# Patient Record
Sex: FEMALE | Race: WHITE | NOT HISPANIC OR LATINO | Employment: OTHER | URBAN - METROPOLITAN AREA
[De-identification: names, ages, dates, MRNs, and addresses within clinical notes are randomized per-mention and may not be internally consistent; named-entity substitution may affect disease eponyms.]

---

## 2019-10-02 ENCOUNTER — AMBULATORY - RIVER FALLS (OUTPATIENT)
Dept: FAMILY MEDICINE | Facility: CLINIC | Age: 54
End: 2019-10-02

## 2021-04-19 ENCOUNTER — TRANSFERRED RECORDS (OUTPATIENT)
Dept: HEALTH INFORMATION MANAGEMENT | Facility: CLINIC | Age: 56
End: 2021-04-19

## 2021-05-04 ENCOUNTER — TRANSFERRED RECORDS (OUTPATIENT)
Dept: HEALTH INFORMATION MANAGEMENT | Facility: CLINIC | Age: 56
End: 2021-05-04

## 2022-07-09 ENCOUNTER — TELEPHONE ENCOUNTER (OUTPATIENT)
Dept: URBAN - METROPOLITAN AREA CLINIC 121 | Facility: CLINIC | Age: 57
End: 2022-07-09

## 2022-07-10 ENCOUNTER — TELEPHONE ENCOUNTER (OUTPATIENT)
Dept: URBAN - METROPOLITAN AREA CLINIC 121 | Facility: CLINIC | Age: 57
End: 2022-07-10

## 2022-09-15 ENCOUNTER — TRANSFERRED RECORDS (OUTPATIENT)
Dept: HEALTH INFORMATION MANAGEMENT | Facility: CLINIC | Age: 57
End: 2022-09-15

## 2022-09-16 ENCOUNTER — TRANSFERRED RECORDS (OUTPATIENT)
Dept: HEALTH INFORMATION MANAGEMENT | Facility: CLINIC | Age: 57
End: 2022-09-16

## 2022-09-16 LAB
CHOLESTEROL (EXTERNAL): 168 MG/DL (ref 0–200)
HBA1C MFR BLD: 5.3 %
HDLC SERPL-MCNC: 52 MG/DL (ref 35–65)
LDL CHOLESTEROL (EXTERNAL): 98 MG/DL (ref 0–130)
POTASSIUM (EXTERNAL): 4.1 MEQ/L (ref 3.5–5.1)
TRIGLYCERIDES (EXTERNAL): 91 MG/DL (ref 0–200)

## 2022-09-28 ENCOUNTER — MEDICAL CORRESPONDENCE (OUTPATIENT)
Dept: HEALTH INFORMATION MANAGEMENT | Facility: CLINIC | Age: 57
End: 2022-09-28

## 2022-10-05 ENCOUNTER — TRANSCRIBE ORDERS (OUTPATIENT)
Dept: OTHER | Age: 57
End: 2022-10-05

## 2022-10-05 DIAGNOSIS — Q23.81 BICUSPID AORTIC VALVE: ICD-10-CM

## 2022-10-05 DIAGNOSIS — R53.83 FATIGUE: ICD-10-CM

## 2022-10-05 DIAGNOSIS — R06.02 SOB (SHORTNESS OF BREATH): Primary | ICD-10-CM

## 2022-11-03 ENCOUNTER — OFFICE VISIT (OUTPATIENT)
Dept: CARDIOLOGY | Facility: CLINIC | Age: 57
End: 2022-11-03
Attending: FAMILY MEDICINE
Payer: COMMERCIAL

## 2022-11-03 VITALS
DIASTOLIC BLOOD PRESSURE: 78 MMHG | HEIGHT: 69 IN | RESPIRATION RATE: 16 BRPM | WEIGHT: 255 LBS | BODY MASS INDEX: 37.77 KG/M2 | OXYGEN SATURATION: 95 % | HEART RATE: 67 BPM | SYSTOLIC BLOOD PRESSURE: 128 MMHG

## 2022-11-03 DIAGNOSIS — I10 BENIGN ESSENTIAL HYPERTENSION: ICD-10-CM

## 2022-11-03 DIAGNOSIS — Q23.81 BICUSPID AORTIC VALVE: Primary | ICD-10-CM

## 2022-11-03 DIAGNOSIS — Z82.49 FAMILY HISTORY OF ISCHEMIC HEART DISEASE: ICD-10-CM

## 2022-11-03 DIAGNOSIS — I35.0 NONRHEUMATIC AORTIC VALVE STENOSIS: ICD-10-CM

## 2022-11-03 DIAGNOSIS — I35.1 NONRHEUMATIC AORTIC VALVE INSUFFICIENCY: ICD-10-CM

## 2022-11-03 PROCEDURE — 99204 OFFICE O/P NEW MOD 45 MIN: CPT | Performed by: INTERNAL MEDICINE

## 2022-11-03 RX ORDER — MELOXICAM 15 MG/1
15 TABLET ORAL DAILY
COMMUNITY

## 2022-11-03 RX ORDER — HYDROCHLOROTHIAZIDE 25 MG/1
25 TABLET ORAL
COMMUNITY

## 2022-11-03 RX ORDER — LOSARTAN POTASSIUM 25 MG/1
25 TABLET ORAL DAILY
COMMUNITY
End: 2022-11-03

## 2022-11-03 RX ORDER — CYANOCOBALAMIN 1000 UG/ML
INJECTION INTRAMUSCULAR; SUBCUTANEOUS
COMMUNITY
Start: 2022-09-15 | End: 2023-11-30

## 2022-11-03 RX ORDER — TRAZODONE HYDROCHLORIDE 100 MG/1
100 TABLET ORAL AT BEDTIME
COMMUNITY

## 2022-11-03 RX ORDER — SULFAMETHOXAZOLE/TRIMETHOPRIM 800-160 MG
TABLET ORAL
COMMUNITY
Start: 2022-04-13 | End: 2022-11-03

## 2022-11-03 RX ORDER — ORAL SEMAGLUTIDE 3 MG/1
1 TABLET ORAL DAILY
COMMUNITY
Start: 2022-10-13 | End: 2023-11-30

## 2022-11-03 RX ORDER — TRIAMCINOLONE ACETONIDE 1 MG/G
CREAM TOPICAL
COMMUNITY
Start: 2022-03-16 | End: 2022-11-03

## 2022-11-03 RX ORDER — COLCHICINE 0.6 MG/1
TABLET ORAL
COMMUNITY
Start: 2022-03-02 | End: 2022-11-03

## 2022-11-03 RX ORDER — SUMATRIPTAN 100 MG/1
100 TABLET, FILM COATED ORAL
COMMUNITY

## 2022-11-03 RX ORDER — ESOMEPRAZOLE MAGNESIUM 40 MG/1
40 CAPSULE, DELAYED RELEASE ORAL DAILY
COMMUNITY

## 2022-11-03 RX ORDER — NEEDLES, SAFETY 22GX1 1/2"
NEEDLE, DISPOSABLE MISCELLANEOUS
COMMUNITY
Start: 2022-07-13

## 2022-11-03 RX ORDER — MECLIZINE HCL 12.5 MG 12.5 MG/1
TABLET ORAL
COMMUNITY
Start: 2022-07-13 | End: 2022-11-03

## 2022-11-03 RX ORDER — SCOLOPAMINE TRANSDERMAL SYSTEM 1 MG/1
PATCH, EXTENDED RELEASE TRANSDERMAL
COMMUNITY
Start: 2022-08-04 | End: 2022-11-03

## 2022-11-03 RX ORDER — DESVENLAFAXINE 50 MG/1
50 TABLET, FILM COATED, EXTENDED RELEASE ORAL DAILY
COMMUNITY
End: 2022-11-03

## 2022-11-03 NOTE — PROGRESS NOTES
[facility]  [phone]      Assessment/Recommendations   Patient with known bicuspid aortic valve with a heart murmur described at age 18.  She has mild aortic stenosis and mild to moderate aortic insufficiency with normal left ventricular systolic function.  She does not have any symptoms of angina, unusual shortness of breath with activity, or syncope with activity.  She has borderline left ventricular hypertrophy.    We talked about bicuspid aortic valve and the symptoms to watch for and I would recommend a repeat echocardiogram in 1 year.  She has had cardiac MRIs in the past and we may want a repeat in the next couple of years so that we can get it even better look at the ascending aorta.    She does have a family history of coronary artery disease and wondered about reducing her risk of cardiac events.  Her LDL cholesterol is actually pretty good but she would be interested in having a calcium score so we will get that in the near future and get back to her with the results.    I have not change in her medications today.  We will plan on seeing her back in 1 year after echocardiogram and hopefully we can do that at the [facility].    Thank you for allowing us to participate in her care.    45 minutes spent with chart review, patient visit, documentation, further chart review, and .       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  She had a heart murmur described at age 18 and has been followed the last several years with echoes and cardiac MRIs.  Her valve is been stable.  She has mild aortic stenosis and mild to moderate aortic insufficiency with normal left ventricular ejection fraction and borderline concentric left ventricular hypertrophy.  She has been feeling well.  She tried a medication for weight loss which caused her to feel short of breath with the breathing issue resolved when she  "went off of the medication.  She denies orthopnea, paroxysmal nocturnal dyspnea, peripheral edema, syncope or near syncopal episodes.  Sometimes when she exercises she will get lightheaded and put her head between her knees but this is rare and has not happened for quite some time.  She has no history rheumatic fever, cerebrovascular accident or TIA.    She is never smoked cigarettes has been treated for high blood pressure and her mother has had an MI which was silent on an EKG and had heart muscle damage so I presume she had an echocardiogram.  Patient is not diabetic and LDL cholesterol is 88.    The patient obtain a PhD at the [employer] in business.  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  This was very stressful during COVID and she ended up resigning and is trying to relieve stress and take a year off while she is going out what her next steps will be.  She is not  and she does not have children.           Physical Examination Review of Systems   /78 (BP Location: Left arm, Patient Position: Sitting, Cuff Size: Adult Large)   Pulse 67   Resp 16   Ht 1.759 m (5' 9.25\")   Wt 115.7 kg (255 lb)   SpO2 95%   BMI 37.39 kg/m    Body mass index is 37.39 kg/m .  Wt Readings from Last 3 Encounters:   11/03/22 115.7 kg (255 lb)   03/07/14 96.6 kg (213 lb)     General Appearance:   Alert, cooperative and in no acute distress.   ENT/Mouth: Patient wearing a mask.      EYES:  no scleral icterus, normal conjunctivae   Neck: JVP normal. No Hepatojugular reflux. Thyroid not visualized.   Chest/Lungs:   Lungs are clear to auscultation, equal chest wall expansion.   Cardiovascular:   S1, S2 with 2/6 systolic and 1/6 diastolic murmurs , no clicks or rubs. Brachial, radial  pulses are intact and symetric. No carotid bruits noted   Abdomen:  Nontender. BS+.    Extremities: No cyanosis, clubbing or edema   Skin: no " "xanthelasma, warm.    Neurologic: normal arm movement bilateral, no tremors     Psychiatric: Appropriate affect.      Enc Vitals  BP: 128/78  Pulse: 67  Resp: 16  SpO2: 95 %  Weight: 115.7 kg (255 lb)  Height: 175.9 cm (5' 9.25\")                                           Medical History  Surgical History Family History Social History   No past medical history on file. No past surgical history on file. No family history on file. Social History     Socioeconomic History     Marital status: Single     Spouse name: Not on file     Number of children: Not on file     Years of education: Not on file     Highest education level: Not on file   Occupational History     Not on file   Tobacco Use     Smoking status: Never     Smokeless tobacco: Never   Vaping Use     Vaping Use: Never used   Substance and Sexual Activity     Alcohol use: Yes     Drug use: Never     Sexual activity: Not on file   Other Topics Concern     Not on file   Social History Narrative     Not on file     Social Determinants of Health     Financial Resource Strain: Not on file   Food Insecurity: Not on file   Transportation Needs: Not on file   Physical Activity: Not on file   Stress: Not on file   Social Connections: Not on file   Intimate Partner Violence: Not on file   Housing Stability: Not on file          Medications  Allergies   Current Outpatient Medications   Medication Sig Dispense Refill     B-D TB SYRINGE 27G X 1/2\" 1 ML MISC USE 1 SYRINGE PER MONTH TO ADMINISTER VITAMIN B12       DODEX 1000 MCG/ML injection        esomeprazole (NEXIUM) 40 MG DR capsule Take 40 mg by mouth daily       hydrochlorothiazide (HYDRODIURIL) 25 MG tablet Take 25 mg by mouth       meloxicam (MOBIC) 15 MG tablet Take 15 mg by mouth       SUMAtriptan (IMITREX) 100 MG tablet Take 100 mg by mouth       traZODone (DESYREL) 100 MG tablet Take 100 mg by mouth       RYBELSUS 3 MG TABS Take 1 tablet by mouth daily (Patient not taking: Reported on 11/3/2022)      Allergies " "  Allergen Reactions     Nitrofurantoin Difficulty breathing and Other (See Comments)     \"Dantin\" group side effects  Pt cannot remember reaction       No Clinical Screening - See Comments Itching     Dust, pollen, cat dander,(lives with 3 cats), watermelon, make up- runny nose, sneezing, itchy eyes  Dust, pollen, cat dander,(lives with 3 cats), watermelon, make up- runny nose, sneezing, itchy eyes     Quinolones Other (See Comments)     Other reaction(s): Torn muscle  Torn muscle  Tendon inflammation       Zoster Vaccine Live Rash         Lab Results    Chemistry/lipid CBC Cardiac Enzymes/BNP/TSH/INR   Lab Results   Component Value Date    TRIG 91 09/16/2022    No results found for: WBC, HGB, HCT, MCV, PLT No results found for: CKTOTAL, CKMB, TROPONINI, BNP, TSH, INR                                        "

## 2022-11-14 ENCOUNTER — HOSPITAL ENCOUNTER (OUTPATIENT)
Dept: CT IMAGING | Facility: CLINIC | Age: 57
Discharge: HOME | End: 2022-11-14
Attending: INTERNAL MEDICINE | Admitting: INTERNAL MEDICINE

## 2022-11-14 DIAGNOSIS — Z82.49 FAMILY HISTORY OF ISCHEMIC HEART DISEASE: ICD-10-CM

## 2022-11-14 DIAGNOSIS — I10 BENIGN ESSENTIAL HYPERTENSION: ICD-10-CM

## 2022-11-14 DIAGNOSIS — Q23.81 BICUSPID AORTIC VALVE: ICD-10-CM

## 2022-11-14 DIAGNOSIS — I35.1 NONRHEUMATIC AORTIC VALVE INSUFFICIENCY: ICD-10-CM

## 2022-11-14 DIAGNOSIS — I35.0 NONRHEUMATIC AORTIC VALVE STENOSIS: ICD-10-CM

## 2022-11-14 LAB
CV CALCIUM SCORE AGATSTON LM: 0
CV CALCIUM SCORING AGATSON LAD: 0
CV CALCIUM SCORING AGATSTON CX: 0
CV CALCIUM SCORING AGATSTON RCA: 0
CV CALCIUM SCORING AGATSTON TOTAL: 0

## 2022-11-14 PROCEDURE — 75571 CT HRT W/O DYE W/CA TEST: CPT

## 2022-11-14 PROCEDURE — 75571 CT HRT W/O DYE W/CA TEST: CPT | Mod: 26 | Performed by: INTERNAL MEDICINE

## 2022-11-17 DIAGNOSIS — Z82.49 FAMILY HISTORY OF ISCHEMIC HEART DISEASE: ICD-10-CM

## 2022-11-17 DIAGNOSIS — Q23.81 BICUSPID AORTIC VALVE: Primary | ICD-10-CM

## 2023-02-12 ENCOUNTER — HEALTH MAINTENANCE LETTER (OUTPATIENT)
Age: 58
End: 2023-02-12

## 2023-08-10 ENCOUNTER — TRANSFERRED RECORDS (OUTPATIENT)
Dept: HEALTH INFORMATION MANAGEMENT | Facility: CLINIC | Age: 58
End: 2023-08-10

## 2023-08-10 LAB
ALT SERPL-CCNC: 25 U/L
AST SERPL-CCNC: 26 U/L (ref 14–36)
CHOLESTEROL (EXTERNAL): 192 MG/DL (ref 0–200)
CREATININE (EXTERNAL): 0.9 MG/DL (ref 0.7–1.4)
GFR ESTIMATED (EXTERNAL): >60 ML/MIN/1.73M2
GLUCOSE (EXTERNAL): 85 MG/DL (ref 60–99)
HBA1C MFR BLD: 5.05 % (ref 4–5.6)
HDLC SERPL-MCNC: 51 MG/DL (ref 35–65)
LDL CHOLESTEROL CALCULATED (EXTERNAL): 120 MG/DL (ref 0–130)
POTASSIUM (EXTERNAL): 3.9 MMOL/L (ref 3.5–5.1)
TRIGLYCERIDES (EXTERNAL): 103 MG/DL (ref 0–200)
TSH SERPL-ACNC: 2.15 MIU/L (ref 0.47–4.68)

## 2023-10-12 LAB — PAP SMEAR - HIM PATIENT REPORTED: NORMAL

## 2023-11-20 ENCOUNTER — HOSPITAL ENCOUNTER (OUTPATIENT)
Dept: CARDIOLOGY | Facility: CLINIC | Age: 58
Discharge: HOME | End: 2023-11-20
Attending: INTERNAL MEDICINE | Admitting: INTERNAL MEDICINE
Payer: COMMERCIAL

## 2023-11-20 DIAGNOSIS — I10 BENIGN ESSENTIAL HYPERTENSION: ICD-10-CM

## 2023-11-20 DIAGNOSIS — I35.0 NONRHEUMATIC AORTIC VALVE STENOSIS: ICD-10-CM

## 2023-11-20 DIAGNOSIS — Q23.81 BICUSPID AORTIC VALVE: ICD-10-CM

## 2023-11-20 DIAGNOSIS — I35.1 NONRHEUMATIC AORTIC VALVE INSUFFICIENCY: ICD-10-CM

## 2023-11-20 DIAGNOSIS — Z82.49 FAMILY HISTORY OF ISCHEMIC HEART DISEASE: ICD-10-CM

## 2023-11-20 LAB — LVEF ECHO: NORMAL

## 2023-11-20 PROCEDURE — 93306 TTE W/DOPPLER COMPLETE: CPT | Mod: 26 | Performed by: INTERNAL MEDICINE

## 2023-11-20 PROCEDURE — 93306 TTE W/DOPPLER COMPLETE: CPT

## 2023-11-30 ENCOUNTER — OFFICE VISIT (OUTPATIENT)
Dept: CARDIOLOGY | Facility: CLINIC | Age: 58
End: 2023-11-30
Payer: COMMERCIAL

## 2023-11-30 VITALS — WEIGHT: 227.2 LBS | HEART RATE: 92 BPM | BODY MASS INDEX: 33.65 KG/M2 | RESPIRATION RATE: 16 BRPM | HEIGHT: 69 IN

## 2023-11-30 DIAGNOSIS — I48.0 PAROXYSMAL ATRIAL FIBRILLATION (H): Primary | ICD-10-CM

## 2023-11-30 DIAGNOSIS — Q23.81 BICUSPID AORTIC VALVE: ICD-10-CM

## 2023-11-30 DIAGNOSIS — G47.33 OSA (OBSTRUCTIVE SLEEP APNEA): ICD-10-CM

## 2023-11-30 DIAGNOSIS — I77.89 ASCENDING AORTA ENLARGEMENT (H): ICD-10-CM

## 2023-11-30 PROCEDURE — 99214 OFFICE O/P EST MOD 30 MIN: CPT | Performed by: INTERNAL MEDICINE

## 2023-11-30 RX ORDER — COLCHICINE 0.6 MG/1
0.6 TABLET ORAL PRN
COMMUNITY
Start: 2022-03-02

## 2023-11-30 RX ORDER — TIRZEPATIDE 7.5 MG/.5ML
7.5 INJECTION, SOLUTION SUBCUTANEOUS WEEKLY
COMMUNITY

## 2023-11-30 NOTE — PROGRESS NOTES
[facility]  [phone]          Assessment/Recommendations   Patient with known bicuspid aortic valve, recent echocardiogram shows mild aortic stenosis and mild aortic insufficiency which is stable.  Ascending aorta is also mildly enlarged and we will repeat imaging next year with a cardiac MRI.  This will allow us to look at the ascending aorta in a very detailed fashion as well.    She also has an episode of atrial fibrillation while in [place].  Episode was 5 hours and she did have some palpitations.  She has not had a recurrence.  She showed me strips from her Apple Watch and I believe it is clear-cut atrial fibrillation.  A little unusual that the ventricular response is not particularly fast.  She has a CHADS2 vascular score of 3.  We talked about anticoagulation and given the relatively brief nature of the episode and one-time occurrence I have not recommended anticoagulation at this point in time.      We will get a 2-week ACT monitor to see if she is having asymptomatic atrial fibrillation as she does have mild to moderate sleep apnea is not treating it because of intolerance to the mask.  Will ask her to get a TSH when we get the monitor results back and ask her to see one of our electrophysiologist as well.  Would also have a low threshold for stress testing.    Thank you for allowing us to participate in her care.       History of Present Illness/Subjective    Ms. Jenise Baker is a 58 year old female with known bicuspid aortic valve which we have been following.  She has not been exercising but overall feels good.  She denies orthopnea, paroxysmal nocturnal dyspnea, and gets some minimal peripheral edema.  She has not had any syncopal episodes but does get a little lightheaded at times.  She takes hydrochlorothiazide for her peripheral edema.  She has not been having any chest discomfort.  While in [place] and overdoing it a bit on being up late at night and early in the morning  "as well as a high sodium diet she had an episode of atrial fibrillation as documented on her Apple Watch.  It lasted about 5 hours and then abated and has not returned.  She did have palpitations.    She denies chest discomfort, orthopnea, paroxysmal nocturnal dyspnea.  She has not had syncopal episodes.    Echocardiogram showed stable bicuspid aortic valve.         Physical Examination Review of Systems   Pulse 92   Resp 16   Ht 1.753 m (5' 9\")   Wt 103.1 kg (227 lb 3.2 oz)   BMI 33.55 kg/m    Body mass index is 33.55 kg/m .  Wt Readings from Last 3 Encounters:   11/30/23 103.1 kg (227 lb 3.2 oz)   11/03/22 115.7 kg (255 lb)   03/07/14 96.6 kg (213 lb)     General Appearance:   Alert, cooperative and in no acute distress.   ENT/Mouth: Pink/moist oral mucosa   EYES:  no scleral icterus, normal conjunctivae   Neck: JVP normal. No Hepatojugular reflux. Thyroid not visualized.   Chest/Lungs:   Lungs are clear to auscultation, equal chest wall expansion.   Cardiovascular:   S1, S2 with 3/6 systolic murmur , no clicks or rubs. Brachial, radial pulses are intact and symetric. No carotid bruits noted   Abdomen:  Nontender. BS+.    Extremities: No cyanosis, clubbing and trivial pretibial edema   Skin: no xanthelasma, warm.    Neurologic: normal arm movement bilateral, no tremors     Psychiatric: Appropriate affect.      Enc Vitals  BP:  (would not allow for bp because of too tight on arm)  Pulse: 92  Resp: 16  Weight: 103.1 kg (227 lb 3.2 oz)  Height: 175.3 cm (5' 9\")                                           Medical History  Surgical History Family History Social History   No past medical history on file. No past surgical history on file. No family history on file. Social History     Socioeconomic History    Marital status: Single     Spouse name: Not on file    Number of children: Not on file    Years of education: Not on file    Highest education level: Not on file   Occupational History    Not on file   Tobacco Use " "   Smoking status: Never    Smokeless tobacco: Never   Vaping Use    Vaping Use: Never used   Substance and Sexual Activity    Alcohol use: Yes    Drug use: Never    Sexual activity: Not on file   Other Topics Concern    Not on file   Social History Narrative    Not on file     Social Determinants of Health     Financial Resource Strain: Not on file   Food Insecurity: Not on file   Transportation Needs: Not on file   Physical Activity: Not on file   Stress: Not on file   Social Connections: Not on file   Interpersonal Safety: Not on file   Housing Stability: Not on file          Medications  Allergies   Current Outpatient Medications   Medication Sig Dispense Refill    B-D TB SYRINGE 27G X 1/2\" 1 ML MISC USE 1 SYRINGE PER MONTH TO ADMINISTER VITAMIN B12      colchicine (COLCRYS) 0.6 MG tablet Take 0.6 mg by mouth as needed      esomeprazole (NEXIUM) 40 MG DR capsule Take 40 mg by mouth daily      hydrochlorothiazide (HYDRODIURIL) 25 MG tablet Take 25 mg by mouth      meloxicam (MOBIC) 15 MG tablet Take 15 mg by mouth daily      MOUNJARO 7.5 MG/0.5ML pen Inject 7.5 mg Subcutaneous once a week      SUMAtriptan (IMITREX) 100 MG tablet Take 100 mg by mouth at onset of headache      traZODone (DESYREL) 100 MG tablet Take 100 mg by mouth at bedtime      Allergies   Allergen Reactions    Nitrofurantoin Difficulty breathing and Other (See Comments)     \"Dantin\" group side effects  Pt cannot remember reaction      No Clinical Screening - See Comments Itching     Dust, pollen, cat dander,(lives with 3 cats), watermelon, make up- runny nose, sneezing, itchy eyes  Dust, pollen, cat dander,(lives with 3 cats), watermelon, make up- runny nose, sneezing, itchy eyes    Quinolones Other (See Comments)     Other reaction(s): Torn muscle  Torn muscle  Tendon inflammation      Zoster Vaccine Live Rash         Lab Results    Chemistry/lipid CBC Cardiac Enzymes/BNP/TSH/INR   Lab Results   Component Value Date    TRIG 91 09/16/2022    No " "results found for: \"WBC\", \"HGB\", \"HCT\", \"MCV\", \"PLT\" No results found for: \"CKTOTAL\", \"CKMB\", \"TROPONINI\", \"BNP\", \"TSH\", \"INR\"                                         "

## 2023-11-30 NOTE — LETTER
11/30/2023    Shai White MD  [facility] [address]  [facility] [address]    RE: Jenise Baker       Dear Colleague,     I had the pleasure of seeing Jenise Baker in the [facility].      [facility]  [phone]          Assessment/Recommendations   Patient with known bicuspid aortic valve, recent echocardiogram shows mild aortic stenosis and mild aortic insufficiency which is stable.  Ascending aorta is also mildly enlarged and we will repeat imaging next year with a cardiac MRI.  This will allow us to look at the ascending aorta in a very detailed fashion as well.    She also has an episode of atrial fibrillation while in [place].  Episode was 5 hours and she did have some palpitations.  She has not had a recurrence.  She showed me strips from her Apple Watch and I believe it is clear-cut atrial fibrillation.  A little unusual that the ventricular response is not particularly fast.  She has a CHADS2 vascular score of 3.  We talked about anticoagulation and given the relatively brief nature of the episode and one-time occurrence I have not recommended anticoagulation at this point in time.      We will get a 2-week ACT monitor to see if she is having asymptomatic atrial fibrillation as she does have mild to moderate sleep apnea is not treating it because of intolerance to the mask.  Will ask her to get a TSH when we get the monitor results back and ask her to see one of our electrophysiologist as well.  Would also have a low threshold for stress testing.    Thank you for allowing us to participate in her care.       History of Present Illness/Subjective    Ms. Jenise Baker is a 58 year old female with known bicuspid aortic valve which we have been following.  She has not been exercising but overall feels good.  She denies orthopnea, paroxysmal nocturnal dyspnea, and gets some minimal peripheral edema.  She has not had any syncopal episodes but does get a little  "lightheaded at times.  She takes hydrochlorothiazide for her peripheral edema.  She has not been having any chest discomfort.  While in [place] and overdoing it a bit on being up late at night and early in the morning as well as a high sodium diet she had an episode of atrial fibrillation as documented on her Apple Watch.  It lasted about 5 hours and then abated and has not returned.  She did have palpitations.    She denies chest discomfort, orthopnea, paroxysmal nocturnal dyspnea.  She has not had syncopal episodes.    Echocardiogram showed stable bicuspid aortic valve.         Physical Examination Review of Systems   Pulse 92   Resp 16   Ht 1.753 m (5' 9\")   Wt 103.1 kg (227 lb 3.2 oz)   BMI 33.55 kg/m    Body mass index is 33.55 kg/m .  Wt Readings from Last 3 Encounters:   11/30/23 103.1 kg (227 lb 3.2 oz)   11/03/22 115.7 kg (255 lb)   03/07/14 96.6 kg (213 lb)     General Appearance:   Alert, cooperative and in no acute distress.   ENT/Mouth: Pink/moist oral mucosa   EYES:  no scleral icterus, normal conjunctivae   Neck: JVP normal. No Hepatojugular reflux. Thyroid not visualized.   Chest/Lungs:   Lungs are clear to auscultation, equal chest wall expansion.   Cardiovascular:   S1, S2 with 3/6 systolic murmur , no clicks or rubs. Brachial, radial pulses are intact and symetric. No carotid bruits noted   Abdomen:  Nontender. BS+.    Extremities: No cyanosis, clubbing and trivial pretibial edema   Skin: no xanthelasma, warm.    Neurologic: normal arm movement bilateral, no tremors     Psychiatric: Appropriate affect.      Enc Vitals  BP:  (would not allow for bp because of too tight on arm)  Pulse: 92  Resp: 16  Weight: 103.1 kg (227 lb 3.2 oz)  Height: 175.3 cm (5' 9\")                                           Medical History  Surgical History Family History Social History   No past medical history on file. No past surgical history on file. No family history on file. Social History     Socioeconomic History " "   Marital status: Single     Spouse name: Not on file    Number of children: Not on file    Years of education: Not on file    Highest education level: Not on file   Occupational History    Not on file   Tobacco Use    Smoking status: Never    Smokeless tobacco: Never   Vaping Use    Vaping Use: Never used   Substance and Sexual Activity    Alcohol use: Yes    Drug use: Never    Sexual activity: Not on file   Other Topics Concern    Not on file   Social History Narrative    Not on file     Social Determinants of Health     Financial Resource Strain: Not on file   Food Insecurity: Not on file   Transportation Needs: Not on file   Physical Activity: Not on file   Stress: Not on file   Social Connections: Not on file   Interpersonal Safety: Not on file   Housing Stability: Not on file          Medications  Allergies   Current Outpatient Medications   Medication Sig Dispense Refill    B-D TB SYRINGE 27G X 1/2\" 1 ML MISC USE 1 SYRINGE PER MONTH TO ADMINISTER VITAMIN B12      colchicine (COLCRYS) 0.6 MG tablet Take 0.6 mg by mouth as needed      esomeprazole (NEXIUM) 40 MG DR capsule Take 40 mg by mouth daily      hydrochlorothiazide (HYDRODIURIL) 25 MG tablet Take 25 mg by mouth      meloxicam (MOBIC) 15 MG tablet Take 15 mg by mouth daily      MOUNJARO 7.5 MG/0.5ML pen Inject 7.5 mg Subcutaneous once a week      SUMAtriptan (IMITREX) 100 MG tablet Take 100 mg by mouth at onset of headache      traZODone (DESYREL) 100 MG tablet Take 100 mg by mouth at bedtime      Allergies   Allergen Reactions    Nitrofurantoin Difficulty breathing and Other (See Comments)     \"Dantin\" group side effects  Pt cannot remember reaction      No Clinical Screening - See Comments Itching     Dust, pollen, cat dander,(lives with 3 cats), watermelon, make up- runny nose, sneezing, itchy eyes  Dust, pollen, cat dander,(lives with 3 cats), watermelon, make up- runny nose, sneezing, itchy eyes    Quinolones Other (See Comments)     Other " "reaction(s): Torn muscle  Torn muscle  Tendon inflammation      Zoster Vaccine Live Rash         Lab Results    Chemistry/lipid CBC Cardiac Enzymes/BNP/TSH/INR   Lab Results   Component Value Date    TRIG 91 09/16/2022    No results found for: \"WBC\", \"HGB\", \"HCT\", \"MCV\", \"PLT\" No results found for: \"CKTOTAL\", \"CKMB\", \"TROPONINI\", \"BNP\", \"TSH\", \"INR\"             Thank you for allowing me to participate in the care of your patient.      Sincerely,     Sony Castle MD     [facility]  cc:   No referring provider defined for this encounter.      "

## 2023-12-04 ENCOUNTER — HOSPITAL ENCOUNTER (OUTPATIENT)
Dept: CARDIOLOGY | Facility: CLINIC | Age: 58
Discharge: HOME | End: 2023-12-04
Attending: INTERNAL MEDICINE | Admitting: INTERNAL MEDICINE
Payer: COMMERCIAL

## 2023-12-04 DIAGNOSIS — I48.0 PAROXYSMAL ATRIAL FIBRILLATION (H): ICD-10-CM

## 2023-12-04 DIAGNOSIS — I77.89 ASCENDING AORTA ENLARGEMENT (H): ICD-10-CM

## 2023-12-04 DIAGNOSIS — Q23.81 BICUSPID AORTIC VALVE: ICD-10-CM

## 2023-12-04 DIAGNOSIS — G47.33 OSA (OBSTRUCTIVE SLEEP APNEA): ICD-10-CM

## 2023-12-04 PROCEDURE — 93270 REMOTE 30 DAY ECG REV/REPORT: CPT

## 2023-12-20 PROCEDURE — 93272 ECG/REVIEW INTERPRET ONLY: CPT | Performed by: INTERNAL MEDICINE

## 2023-12-22 DIAGNOSIS — I48.0 PAROXYSMAL ATRIAL FIBRILLATION (H): Primary | ICD-10-CM

## 2024-01-03 ENCOUNTER — OFFICE VISIT (OUTPATIENT)
Dept: CARDIOLOGY | Facility: CLINIC | Age: 59
End: 2024-01-03
Attending: INTERNAL MEDICINE
Payer: COMMERCIAL

## 2024-01-03 VITALS
RESPIRATION RATE: 16 BRPM | BODY MASS INDEX: 33.67 KG/M2 | DIASTOLIC BLOOD PRESSURE: 70 MMHG | HEART RATE: 67 BPM | WEIGHT: 228 LBS | SYSTOLIC BLOOD PRESSURE: 116 MMHG | OXYGEN SATURATION: 98 %

## 2024-01-03 DIAGNOSIS — I48.0 PAROXYSMAL ATRIAL FIBRILLATION (H): Primary | ICD-10-CM

## 2024-01-03 DIAGNOSIS — I77.89 ASCENDING AORTA ENLARGEMENT (H): ICD-10-CM

## 2024-01-03 DIAGNOSIS — G47.33 OSA (OBSTRUCTIVE SLEEP APNEA): ICD-10-CM

## 2024-01-03 DIAGNOSIS — Q23.81 BICUSPID AORTIC VALVE: ICD-10-CM

## 2024-01-03 PROCEDURE — 99204 OFFICE O/P NEW MOD 45 MIN: CPT | Performed by: INTERNAL MEDICINE

## 2024-01-03 NOTE — LETTER
1/3/2024    Shai White MD  [facility] [address]  [facility] [address]    RE: Jenise Baker       Dear Colleague,     I had the pleasure of seeing Jenise Baker in the [facility].     [facility]  Cardiac Electrophysiology  [address] [facility] [address]  [place] [address]   Office: [phone]  Fax: [phone]     Cardiac Electrophysiology Consultation    Patient: Jenise Baker   : 1965     Referring Provider: Sony Castle MD  Primary Care Provider: Shai White MD    CHIEF COMPLAINT/REASON FOR VISIT  Paroxysmal atrial fibrillation    Assessment/Recommendations   Jenise Baker is a very pleasant 58 year old female with paroxysmal atrial fibrillation, bicuspid aortic valve and mild ascending aortic enlargement, MARGAUX referred by Dr. Castle for consultation regarding atrial fibrillation.    Paroxysmal atrial fibrillation - single episode, symptomatic with palpitations  [id number] 2  We reviewed atrial fibrillation physiology and considerations including managing stroke risk, rate control, cardioversion, antiarrhythmic drug therapy, and catheter ablation.  We discussed atrial fibrillation ablation procedures, anticipated success rates, the potential need for re-do ablation vs addition of anti-arrhythmic drugs, procedural risks (including groin bleeding, tamponade, phrenic or esophageal injury, stroke, pulmonary vein stenosis) and recovery expectations.  We will plan for the following:  - given her [id number] 2, the long term risk-benefit balance of therapeutic anticoagulation is indeterminate.  We discussed this today - we will defer anticoagulation initiation at present.  - continue using Apple Watch  - when atrial fibrillation burden increases, can plan for ablation vs AAD  - we discussed the ongoing importance of lifestyle modification (maintaining a healthy weight, sleep apnea diagnosis and management, alcohol avoidance) as part of a long  term strategy for atrial fibrillation management      Follow up: as above         History of Present Illness   Jenise Baker is a very pleasant 58 year old female with paroxysmal atrial fibrillation, bicuspid aortic valve and mild ascending aortic enlargement, MARGAUX referred by Dr. Castle for consultation regarding atrial fibrillation.    Mrs. Baker's atrial fibrillation history is as summarized below:  Symptoms: palpitations  Symptom onset date: 11/11/2023  Diagnosis date: 11/11/2023 - Apple Watch ECG  Admissions/ER visits: none  Prior medical therapies: none  Prior DCCVs: none  Prior ablations: none  Percutaneous left atrial appendage occlusion: none    She notes no prior episodes of atrial fibrillation to her 11/11/2023 episode, and none since that time. She denies chest pain, syncope.  She retired around 2021, and has recently enjoyed trips [place] [employer] [place]    Apple Watch ECGs reviewed  11/11/2023 1250 and 1255 - AF, 95-96bpm       Physical Examination  Review of Systems   VITALS: /70 (BP Location: Left arm, Patient Position: Sitting, Cuff Size: Adult Regular)   Pulse 67   Resp 16   Wt 103.4 kg (228 lb)   SpO2 98%   BMI 33.67 kg/m    Wt Readings from Last 3 Encounters:   11/30/23 103.1 kg (227 lb 3.2 oz)   11/03/22 115.7 kg (255 lb)   03/07/14 96.6 kg (213 lb)     CONSTITUTIONAL: well nourished, comfortable, no distress  EYES:  Conjunctivae pink, sclerae clear.    E/N/T:  Oral mucosa pink  RESPIRATORY:  Respiratory effort is normal  CARDIOVASCULAR:  normal S1 and S2  GASTROINTESTINAL:  Abdomen without masses or tenderness  EXTREMITIES:  No clubbing or cyanosis.    MUSCULOSKELETAL:  Overall grossly normal muscle strength  SKIN:  Overall, skin warm and dry, no lesions.  NEURO/PSYCH:  Oriented x 3 with normal affect.   Constitutional:  No weight loss or loss of appetite    Eyes:  No difficulty with vision, no double vision, no dry eyes  ENT:  No sore throat, difficulty swallowing; changes in hearing  "or tinnitus  Cardiovascular: As detailed above  Respiratory:  No cough  Musculoskeletal  No joint pain, muscle aches  Neurologic:  No syncope, lightheadedness, fainting spells   Hematologic: No easy bruising, excessive bleeding tendency   Gastrointestinal:  No jaundice, abdominal pain or abdominal bloating  Genitourinary: No changes in urinary habits, no trouble urinating    Psychiatric: No anxiety or depression      Medical History  Surgical History   No past medical history on file. No past surgical history on file.      Family History Social History   No family history on file.     Social History     Tobacco Use    Smoking status: Never    Smokeless tobacco: Never   Vaping Use    Vaping Use: Never used   Substance Use Topics    Alcohol use: Yes    Drug use: Never         Medications  Allergies     Current Outpatient Medications:     B-D TB SYRINGE 27G X 1/2\" 1 ML MISC, USE 1 SYRINGE PER MONTH TO ADMINISTER VITAMIN B12, Disp: , Rfl:     colchicine (COLCRYS) 0.6 MG tablet, Take 0.6 mg by mouth as needed, Disp: , Rfl:     esomeprazole (NEXIUM) 40 MG DR capsule, Take 40 mg by mouth daily, Disp: , Rfl:     hydrochlorothiazide (HYDRODIURIL) 25 MG tablet, Take 25 mg by mouth, Disp: , Rfl:     meloxicam (MOBIC) 15 MG tablet, Take 15 mg by mouth daily, Disp: , Rfl:     MOUNJARO 7.5 MG/0.5ML pen, Inject 7.5 mg Subcutaneous once a week, Disp: , Rfl:     SUMAtriptan (IMITREX) 100 MG tablet, Take 100 mg by mouth at onset of headache, Disp: , Rfl:     traZODone (DESYREL) 100 MG tablet, Take 100 mg by mouth at bedtime, Disp: , Rfl:      Allergies   Allergen Reactions    Nitrofurantoin Difficulty breathing and Other (See Comments)     \"Dantin\" group side effects  Pt cannot remember reaction      No Clinical Screening - See Comments Itching     Dust, pollen, cat dander,(lives with 3 cats), watermelon, make up- runny nose, sneezing, itchy eyes  Dust, pollen, cat dander,(lives with 3 cats), watermelon, make up- runny nose, " "sneezing, itchy eyes    Quinolones Other (See Comments)     Other reaction(s): Torn muscle  Torn muscle  Tendon inflammation      Zoster Vaccine Live Rash          Lab Results    Chemistry CBC Cardiac Enzymes/BNP/TSH/INR   No results for input(s): \"NA\", \"POTASSIUM\", \"CHLORIDE\", \"CO2\", \"GLC\", \"BUN\", \"CR\", \"GFRESTIMATED\", \"JULES\" in the last 66475 hours.    Invalid input(s): \"GRFESTBLACK\"  No results for input(s): \"CR\" in the last 31221 hours.       No results for input(s): \"WBC\", \"HGB\", \"HCT\", \"MCV\", \"PLT\" in the last 52549 hours.  No results for input(s): \"HGB\" in the last 48748 hours. No results for input(s): \"TROPONINI\" in the last 25033 hours.  No results for input(s): \"BNP\", \"NTBNPI\", \"NTBNP\" in the last 78526 hours.  No results for input(s): \"TSH\" in the last 63736 hours.  No results for input(s): \"INR\" in the last 94760 hours.      Data Review    ECGs (tracings independently reviewed)  4/19/2021 - SR 96bpm, QT//442ms    Cardiac event monitoring from 12/5/2023 to 12/18/2023 (monitored duration 11d 23h 39m) (independently reviewed)  Baseline rhythm was sinus rhythm 71bpm.    Reported heart rate range 50 to 120bpm, average 73bpm.  2 symptom triggers (chest pain, other, lightheaded) correlated to sinus rhythm 75-106bpm..  No tachyarrhythmias.  No atrial fibrillation.  There were no pauses of over 3.0s.  Supraventricular and ventricular ectopic beat frequency are not reported on this monitoring modality.      11/20/2023 TTE  Left ventricular size, wall motion and function are normal. The ejection  fraction is 60-65%.  Normal right ventricle size and systolic function.  Mild valvular aortic stenosis.  There is mild (1+) aortic regurgitation.  Ascending Aorta dilatation is present.       40 minutes was spent reviewing the chart and in direct discussion with the patient.    Cc: Sony Castle MD, Shai White MD Amila Dilusha William, MD  1/3/2024  10:26 AM        Thank you for allowing me to " participate in the care of your patient.      Sincerely,     Frandy Limon MD     [facility]  cc:   Sony Castle MD  [address] [address]

## 2024-01-03 NOTE — PROGRESS NOTES
[facility]  Cardiac Electrophysiology  [address] [facility] [address]  [place] [address]   Office: [phone]  Fax: [phone]     Cardiac Electrophysiology Consultation    Patient: Jenise Baker   : 1965     Referring Provider: Sony Castle MD  Primary Care Provider: Shai White MD    CHIEF COMPLAINT/REASON FOR VISIT  Paroxysmal atrial fibrillation    Assessment/Recommendations   Jenise Baker is a very pleasant 58 year old female with paroxysmal atrial fibrillation, bicuspid aortic valve and mild ascending aortic enlargement, MARGAUX referred by Dr. Castle for consultation regarding atrial fibrillation.    Paroxysmal atrial fibrillation - single episode, symptomatic with palpitations  [id number] 2  We reviewed atrial fibrillation physiology and considerations including managing stroke risk, rate control, cardioversion, antiarrhythmic drug therapy, and catheter ablation.  We discussed atrial fibrillation ablation procedures, anticipated success rates, the potential need for re-do ablation vs addition of anti-arrhythmic drugs, procedural risks (including groin bleeding, tamponade, phrenic or esophageal injury, stroke, pulmonary vein stenosis) and recovery expectations.  We will plan for the following:  - given her [id number] 2, the long term risk-benefit balance of therapeutic anticoagulation is indeterminate.  We discussed this today - we will defer anticoagulation initiation at present.  - continue using Apple Watch  - when atrial fibrillation burden increases, can plan for ablation vs AAD  - we discussed the ongoing importance of lifestyle modification (maintaining a healthy weight, sleep apnea diagnosis and management, alcohol avoidance) as part of a long term strategy for atrial fibrillation management      Follow up: as above         History of Present Illness   Jenise Baker is a very pleasant 58 year old female with paroxysmal atrial fibrillation, bicuspid aortic  valve and mild ascending aortic enlargement, MARGAUX referred by Dr. Castle for consultation regarding atrial fibrillation.    Mrs. Baker's atrial fibrillation history is as summarized below:  Symptoms: palpitations  Symptom onset date: 11/11/2023  Diagnosis date: 11/11/2023 - Apple Watch ECG  Admissions/ER visits: none  Prior medical therapies: none  Prior DCCVs: none  Prior ablations: none  Percutaneous left atrial appendage occlusion: none    She notes no prior episodes of atrial fibrillation to her 11/11/2023 episode, and none since that time. She denies chest pain, syncope.  She retired around 2021, and has recently enjoyed trips to [employer] [place] [employer].    Apple Watch ECGs reviewed  11/11/2023 1250 and 1255 - AF, 95-96bpm       Physical Examination  Review of Systems   VITALS: /70 (BP Location: Left arm, Patient Position: Sitting, Cuff Size: Adult Regular)   Pulse 67   Resp 16   Wt 103.4 kg (228 lb)   SpO2 98%   BMI 33.67 kg/m    Wt Readings from Last 3 Encounters:   11/30/23 103.1 kg (227 lb 3.2 oz)   11/03/22 115.7 kg (255 lb)   03/07/14 96.6 kg (213 lb)     CONSTITUTIONAL: well nourished, comfortable, no distress  EYES:  Conjunctivae pink, sclerae clear.    E/N/T:  Oral mucosa pink  RESPIRATORY:  Respiratory effort is normal  CARDIOVASCULAR:  normal S1 and S2  GASTROINTESTINAL:  Abdomen without masses or tenderness  EXTREMITIES:  No clubbing or cyanosis.    MUSCULOSKELETAL:  Overall grossly normal muscle strength  SKIN:  Overall, skin warm and dry, no lesions.  NEURO/PSYCH:  Oriented x 3 with normal affect.   Constitutional:  No weight loss or loss of appetite    Eyes:  No difficulty with vision, no double vision, no dry eyes  ENT:  No sore throat, difficulty swallowing; changes in hearing or tinnitus  Cardiovascular: As detailed above  Respiratory:  No cough  Musculoskeletal  No joint pain, muscle aches  Neurologic:  No syncope, lightheadedness, fainting spells   Hematologic: No easy bruising,  "excessive bleeding tendency   Gastrointestinal:  No jaundice, abdominal pain or abdominal bloating  Genitourinary: No changes in urinary habits, no trouble urinating    Psychiatric: No anxiety or depression      Medical History  Surgical History   No past medical history on file. No past surgical history on file.      Family History Social History   No family history on file.     Social History     Tobacco Use    Smoking status: Never    Smokeless tobacco: Never   Vaping Use    Vaping Use: Never used   Substance Use Topics    Alcohol use: Yes    Drug use: Never         Medications  Allergies     Current Outpatient Medications:     B-D TB SYRINGE 27G X 1/2\" 1 ML MISC, USE 1 SYRINGE PER MONTH TO ADMINISTER VITAMIN B12, Disp: , Rfl:     colchicine (COLCRYS) 0.6 MG tablet, Take 0.6 mg by mouth as needed, Disp: , Rfl:     esomeprazole (NEXIUM) 40 MG DR capsule, Take 40 mg by mouth daily, Disp: , Rfl:     hydrochlorothiazide (HYDRODIURIL) 25 MG tablet, Take 25 mg by mouth, Disp: , Rfl:     meloxicam (MOBIC) 15 MG tablet, Take 15 mg by mouth daily, Disp: , Rfl:     MOUNJARO 7.5 MG/0.5ML pen, Inject 7.5 mg Subcutaneous once a week, Disp: , Rfl:     SUMAtriptan (IMITREX) 100 MG tablet, Take 100 mg by mouth at onset of headache, Disp: , Rfl:     traZODone (DESYREL) 100 MG tablet, Take 100 mg by mouth at bedtime, Disp: , Rfl:      Allergies   Allergen Reactions    Nitrofurantoin Difficulty breathing and Other (See Comments)     \"Dantin\" group side effects  Pt cannot remember reaction      No Clinical Screening - See Comments Itching     Dust, pollen, cat dander,(lives with 3 cats), watermelon, make up- runny nose, sneezing, itchy eyes  Dust, pollen, cat dander,(lives with 3 cats), watermelon, make up- runny nose, sneezing, itchy eyes    Quinolones Other (See Comments)     Other reaction(s): Torn muscle  Torn muscle  Tendon inflammation      Zoster Vaccine Live Rash          Lab Results    Chemistry CBC Cardiac " "Enzymes/BNP/TSH/INR   No results for input(s): \"NA\", \"POTASSIUM\", \"CHLORIDE\", \"CO2\", \"GLC\", \"BUN\", \"CR\", \"GFRESTIMATED\", \"JULES\" in the last 44632 hours.    Invalid input(s): \"GRFESTBLACK\"  No results for input(s): \"CR\" in the last 28205 hours.       No results for input(s): \"WBC\", \"HGB\", \"HCT\", \"MCV\", \"PLT\" in the last 10110 hours.  No results for input(s): \"HGB\" in the last 36560 hours. No results for input(s): \"TROPONINI\" in the last 88614 hours.  No results for input(s): \"BNP\", \"NTBNPI\", \"NTBNP\" in the last 86354 hours.  No results for input(s): \"TSH\" in the last 55387 hours.  No results for input(s): \"INR\" in the last 90597 hours.      Data Review    ECGs (tracings independently reviewed)  4/19/2021 - SR 96bpm, QT//442ms    Cardiac event monitoring from 12/5/2023 to 12/18/2023 (monitored duration 11d 23h 39m) (independently reviewed)  Baseline rhythm was sinus rhythm 71bpm.    Reported heart rate range 50 to 120bpm, average 73bpm.  2 symptom triggers (chest pain, other, lightheaded) correlated to sinus rhythm 75-106bpm..  No tachyarrhythmias.  No atrial fibrillation.  There were no pauses of over 3.0s.  Supraventricular and ventricular ectopic beat frequency are not reported on this monitoring modality.      11/20/2023 TTE  Left ventricular size, wall motion and function are normal. The ejection  fraction is 60-65%.  Normal right ventricle size and systolic function.  Mild valvular aortic stenosis.  There is mild (1+) aortic regurgitation.  Ascending Aorta dilatation is present.       40 minutes was spent reviewing the chart and in direct discussion with the patient.    Cc: Sony Castle MD, Shai White MD Amila Dilusha William, MD  1/3/2024  10:26 AM      "

## 2024-01-03 NOTE — PATIENT INSTRUCTIONS
[facility]  Cardiac Electrophysiology  [address]  [place] [address]   Office: [phone]  Fax: [phone]     Thank you for seeing us in clinic today - it is a pleasure to be a part of your care team.  Below is a summary of our plan from today's visit.       You have an episode of paroxysmal atrial fibrillation.  We reviewed physiology and treatment options including antiarrhythmic drug therapy, catheter ablation and lifestyle modification.  We will plan for the following:  - continue using Apple Watch to track atrial fibrillation burden  - when atrial fibrillation burden increases, can plan for ablation vs AAD    Please do not hesitate to be in touch with our office at [phone] with any questions that may arise.       Thank you for trusting us with your care,    Frandy Limon MD  Clinical Cardiac Electrophysiology  [facility]  [address]  [place] [address]   Office: [phone]  Fax: [phone]        ATRIAL FIBRILLATION: Patient Information    What is atrial fibrillation?  Atrial fibrillation (AF, A-fib) is a common heart rhythm problem (arrhythmia) occurring within the upper chambers of the heart (the atria).  In normal rhythm, the upper and lower chambers of the heart are electrically driven to contract in a coordinated sequence.  In atrial fibrillation, the atria lose their ability to contract because of rapid and chaotic electrical activity.  The lower chambers of the heart (the ventricles) continue to pump blood throughout the body, though with irregular and often faster rate due to the chaotic activity within the atria.        How do I know if I have atrial fibrillation?   Some people may feel their heart beating faster, harder, or irregularly while in atrial fibrillation.  Others may be lightheaded, fatigued, feel weak or tired or become more short of breath especially with activities.  Some patients  have no symptoms at all.  Atrial fibrillation may be found due to an irregular pulse or on an electrocardiogram (ECG). Atrial fibrillation can start and stop on its own, and episodes can last from seconds to several months.      How common is atrial fibrillation?   An estimated 3-6 million people in the [place] have atrial fibrillation.  Atrial fibrillation is a common heart rhythm problem for older persons, affecting as estimated 12-15% of people over the age of 65 years of age.    What causes atrial fibrillation?   Age is the most important risk factor for atrial fibrillation.  Atrial fibrillation is more common in people with other heart disease, high blood pressure, diabetes, obesity, sleep apnea and in people who regularly consume alcohol.  Surgery, lung disease, or thyroid problems can lead to atrial fibrillation.  Atrial fibrillation has multiple possible causes, and in most cases a single cause cannot be found.  Atrial fibrillation is a progressive condition, usually starting with at an early stage with short and infrequent episodes.  In later stages of disease, more frequent and longer lasting episodes of atrial fibrillation occur, ultimately culminating in episodes which do not spontaneously terminate.  Generally, more enlargement and scarring within the upper chambers of the heart is observed as atrial fibrillation progresses from early to late-stage disease.    How is atrial fibrillation diagnosed and evaluated?    Because of its start-stop nature, atrial fibrillation can be challenging to diagnose.  Atrial fibrillation is most commonly diagnosed via cardiac rhythm recordings - either an ECG or wearable cardiac rhythm monitor.  For patients with pacemakers, defibrillators or implantable loop recorders, atrial fibrillation may be recorded via these devices.  Recently, commercially available devices (eg. Apple Watch, [employer] device, certain FitBit devices, others) can allow patients to take 30 second  cardiac rhythm recordings which may document atrial fibrillation.  Once atrial fibrillation is diagnosed, additional tests include blood tests and an echocardiogram.  The echocardiogram uses ultrasound to look at your heart to assess your cardiac function and evaluate for other heart disease.  Additional evaluation may include CT or MRI studies.    Is atrial fibrillation dangerous?   Atrial fibrillation is not usually a life-threatening arrhythmia.  The most serious consequences of atrial fibrillation including stroke and worsening of overall cardiac function.  While in atrial fibrillation, the upper cardiac chambers do not contract normally, resulting in slower blood flow and increased risk of clot formation.  If this blood clot becomes detached from the heart a stroke can occur.  Unfortunately, stroke can be the first sign of atrial fibrillation for some people.  With a stroke, you may notice abnormal sensation, weakness on one side of the body or face, changes in your vision or speech.  If you have any of these signs, you should contact EMS and be evaluated in an emergency room as soon as possible.      How is atrial fibrillation treated?     Several treatment options exist for suppressing atrial fibrillation - however, it is not an easily curable arrhythmia.  The first goal in managing atrial fibrillation is to minimize stroke risk.  The second goal is to improve symptoms associated with atrial fibrillation.  Finally, in patients with reduced cardiac function, maintaining normal rhythm can help improve cardiac function.      Blood thinners are used to reduce the risk of stroke in patients with high estimated stroke risk related to atrial fibrillation.  For patients at higher risk of bleeding related to blood thinner use, implantable devices may be an option to reduce stroke risk without the need for long term blood thinner use.      Atrial fibrillation can be managed via two strategies: rate control and rhythm  control.  In a rate control strategy, continued atrial fibrillation is accepted and medications (eg. beta-blockers or calcium channel blockers) are used to control the lower chamber rate.  In a rhythm control strategy, anti-arrhythmic medications or catheter ablation are used to maintain normal cardiac rhythm and slow disease progression by suppressing atrial fibrillation.  A procedure called a cardioversion, in which an electric shock is delivered through patches placed on the chest wall while under deep sedation, can be performed to temporarily restore normal cardiac rhythm, though does not address the chance of atrial fibrillation recurrence.  Treatments are more effective for earlier rather than later stage atrial fibrillation.  Lifestyle modifications (maintaining a healthy weight, aerobic exercise, diagnosing and treating sleep apnea, and minimizing alcohol intake) are important elements of atrial fibrillation rhythm control.     What is catheter ablation for atrial fibrillation?  Cardiac catheter ablation is a commonly performed, minimally invasive procedure performed by a cardiac electrophysiologist to treat many different cardiac rhythm abnormalities.  During catheter ablation, long, thin, flexible tubes are advanced into the heart via small sheaths inserted into the femoral veins and thermal energy (either heating or cooling) is applied within the heart to disrupt abnormal electrical activity.  Atrial fibrillation ablation is performed under general anesthesia, with procedures generally taking approximately 2-3 hours.  Patients are typically observed for 3-5 hours after the ablation, and in most cases can be discharged home the same day.  Atrial fibrillation ablation is associated with better outcomes (mortality, cardiovascular hospitalizations, atrial arrhythmia recurrences) compared to antiarrhythmic drug therapy.  However, atrial fibrillation recurrences are not uncommon, and repeat catheter ablation  may be offered.  Your electrophysiology team can review atrial fibrillation ablation, anticipated success rates, risks, and recovery expectations with you.    What are anti-arrhythmic medications?  Anti-arrhythmic medications are specialized drugs which alter cardiac electrical functioning to suppress arrhythmias.  There are several anti-arrhythmic medications available, each with its own success rate and side effects.  Some anti-arrhythmic medications are less effective though safer to use, others are more effective though have serious potential toxicities.  Atrial fibrillation recurrences are common and may require dose adjustment or change in antiarrhythmic therapy.  Your electrophysiology team will carefully consider which medication would be the best and safest for your particular case.      Can I live a normal life?    The goal of atrial fibrillation management is for patients to live normal lives without being limited by symptoms related to atrial fibrillation.    Are any additional educational resources available?  There are a number of excellent atrial fibrillation education resources available to you online.  A few options you may wish to review include:  hrsonline.org/guide-atrial-fibrillation  afibmatters.org  getsmartaboutafib.com  stopaf.com    What comes next?    Consider your management options and let us know how we can help in your decision process.  Please take medications as they have been prescribed.  You should also get any tests that may have been ordered for you.      When to Call Your Doctor or seek emergency care:  Call your doctor or seek emergency care if you have any significant changes with the following:  Weakness  Dizziness  Fainting  Fatigue  Shortness of breath  Chest pain with increased activity  If you are concerned that your heart rate is too fast or too slow  Bleeding that does not stop in 10 minutes  Coughing or throwing up blood  Bloody diarrhea or bleeding  hemorrhoids  Dark-colored urine or black stool  Allergic reactions:  Rash  Itching  Swelling  Trouble breathing or swallowing      Please call the [facility] at [phone] if you have concerns about your symptoms, your medicines, or your follow-up appointments.

## 2024-01-12 ENCOUNTER — HOSPITAL ENCOUNTER (OUTPATIENT)
Dept: CARDIOLOGY | Facility: CLINIC | Age: 59
Discharge: HOME | End: 2024-01-12
Attending: INTERNAL MEDICINE | Admitting: INTERNAL MEDICINE
Payer: COMMERCIAL

## 2024-01-12 DIAGNOSIS — I48.0 PAROXYSMAL ATRIAL FIBRILLATION (H): ICD-10-CM

## 2024-01-12 PROCEDURE — 93350 STRESS TTE ONLY: CPT | Mod: 26 | Performed by: INTERNAL MEDICINE

## 2024-01-12 PROCEDURE — 93321 DOPPLER ECHO F-UP/LMTD STD: CPT | Mod: 26 | Performed by: INTERNAL MEDICINE

## 2024-01-12 PROCEDURE — 93325 DOPPLER ECHO COLOR FLOW MAPG: CPT | Mod: TC

## 2024-01-12 PROCEDURE — 93016 CV STRESS TEST SUPVJ ONLY: CPT | Performed by: INTERNAL MEDICINE

## 2024-01-12 PROCEDURE — 93018 CV STRESS TEST I&R ONLY: CPT | Performed by: INTERNAL MEDICINE

## 2024-01-12 PROCEDURE — 93325 DOPPLER ECHO COLOR FLOW MAPG: CPT | Mod: 26 | Performed by: INTERNAL MEDICINE

## 2024-01-16 ENCOUNTER — TRANSFERRED RECORDS (OUTPATIENT)
Dept: MULTI SPECIALTY CLINIC | Facility: CLINIC | Age: 59
End: 2024-01-16

## 2024-03-10 ENCOUNTER — HEALTH MAINTENANCE LETTER (OUTPATIENT)
Age: 59
End: 2024-03-10

## 2025-01-27 ENCOUNTER — TELEPHONE (OUTPATIENT)
Dept: CARDIOLOGY | Facility: CLINIC | Age: 60
End: 2025-01-27
Payer: COMMERCIAL

## 2025-01-27 NOTE — TELEPHONE ENCOUNTER
[facility] Call Center    Phone Message    May a detailed message be left on voicemail: yes     Reason for Call: Other: Patient was told by Dr. Castle she need a cardiac MRI however, she has not been scheduled for one. I also do not see an order. Can an order by placed and the patient contacted to schedule both MRI and follow up with Dr. Castle?      Action Taken: Message routed to:  Other: Cardio    Travel Screening: Not Applicable     Date of Service:                                                 Thank you!  [facility]

## 2025-01-27 NOTE — TELEPHONE ENCOUNTER
Extended order for cardiac MRI and follow-up with .     Spoke with patient and updated her, she verbalized understanding. Pt transferred to central imaging to schedule Cardiac MRI.   Pt instructed to call back to schedule follow-up with .griselda

## 2025-03-03 ENCOUNTER — HOSPITAL ENCOUNTER (OUTPATIENT)
Dept: MRI IMAGING | Facility: HOSPITAL | Age: 60
Discharge: HOME | End: 2025-03-03
Attending: INTERNAL MEDICINE
Payer: COMMERCIAL

## 2025-03-03 DIAGNOSIS — I48.0 PAROXYSMAL ATRIAL FIBRILLATION (H): ICD-10-CM

## 2025-03-03 DIAGNOSIS — G47.33 OSA (OBSTRUCTIVE SLEEP APNEA): ICD-10-CM

## 2025-03-03 DIAGNOSIS — Q23.81 BICUSPID AORTIC VALVE: ICD-10-CM

## 2025-03-03 DIAGNOSIS — I77.89 ASCENDING AORTA ENLARGEMENT: ICD-10-CM

## 2025-03-03 PROCEDURE — 71555 MRI ANGIO CHEST W OR W/O DYE: CPT | Mod: 26 | Performed by: GENERAL ACUTE CARE HOSPITAL

## 2025-03-03 PROCEDURE — A9585 GADOBUTROL INJECTION: HCPCS | Performed by: INTERNAL MEDICINE

## 2025-03-03 PROCEDURE — 71555 MRI ANGIO CHEST W OR W/O DYE: CPT

## 2025-03-03 PROCEDURE — 75561 CARDIAC MRI FOR MORPH W/DYE: CPT | Mod: 26 | Performed by: GENERAL ACUTE CARE HOSPITAL

## 2025-03-03 PROCEDURE — 255N000002 HC RX 255 OP 636: Performed by: INTERNAL MEDICINE

## 2025-03-03 PROCEDURE — 999N000122 MR MYOCARDIUM  OVERREAD

## 2025-03-03 RX ORDER — GADOBUTROL 604.72 MG/ML
16 INJECTION INTRAVENOUS ONCE
Status: COMPLETED | OUTPATIENT
Start: 2025-03-03 | End: 2025-03-03

## 2025-03-03 RX ADMIN — GADOBUTROL 16 ML: 604.72 INJECTION INTRAVENOUS at 12:54

## 2025-03-16 ENCOUNTER — HEALTH MAINTENANCE LETTER (OUTPATIENT)
Age: 60
End: 2025-03-16

## 2025-04-08 ENCOUNTER — OFFICE VISIT (OUTPATIENT)
Dept: CARDIOLOGY | Facility: CLINIC | Age: 60
End: 2025-04-08
Payer: COMMERCIAL

## 2025-04-08 VITALS
HEART RATE: 64 BPM | WEIGHT: 217.2 LBS | SYSTOLIC BLOOD PRESSURE: 116 MMHG | RESPIRATION RATE: 16 BRPM | OXYGEN SATURATION: 96 % | BODY MASS INDEX: 32.07 KG/M2 | DIASTOLIC BLOOD PRESSURE: 62 MMHG

## 2025-04-08 DIAGNOSIS — I77.89 ASCENDING AORTA ENLARGEMENT: ICD-10-CM

## 2025-04-08 DIAGNOSIS — Q23.81 BICUSPID AORTIC VALVE: Primary | ICD-10-CM

## 2025-04-08 PROCEDURE — 3078F DIAST BP <80 MM HG: CPT | Performed by: INTERNAL MEDICINE

## 2025-04-08 PROCEDURE — 3074F SYST BP LT 130 MM HG: CPT | Performed by: INTERNAL MEDICINE

## 2025-04-08 PROCEDURE — G2211 COMPLEX E/M VISIT ADD ON: HCPCS | Performed by: INTERNAL MEDICINE

## 2025-04-08 PROCEDURE — 99214 OFFICE O/P EST MOD 30 MIN: CPT | Performed by: INTERNAL MEDICINE

## 2025-04-08 RX ORDER — TIRZEPATIDE 10 MG/.5ML
10 INJECTION, SOLUTION SUBCUTANEOUS
COMMUNITY
Start: 2025-03-24

## 2025-04-08 RX ORDER — CYANOCOBALAMIN 1000 UG/ML
1000 INJECTION, SOLUTION INTRAMUSCULAR; SUBCUTANEOUS
COMMUNITY

## 2025-04-08 NOTE — PROGRESS NOTES
[facility]  [phone]          Assessment/Recommendations   Patient with known bicuspid aortic valve, recent MRI showing minimal enlargement of the ascending aorta and sinus of Valsalva.  Mild aortic stenosis and mild aortic insufficiency.  Her functional capacity is unchanged although she does have some limitations with orthopedic issues.  No evidence of pulmonary vascular congestion.    She has a history of paroxysmal atrial fibrillation is a and has seen Dr. Limon in the past from electrophysiology.  She had a brief episode this past year and will keep us posted if she has more episodes as he is offered her other therapies.    Blood pressure is at goal, and I have asked her to maintain an active lifestyle with a minimum of 30 minutes of walking or some like exercise, and at least 5 times each week.    We will schedule a follow-up in 1 year at our [place] location and likely schedule imaging thereafter.    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 Due to the added complexity in care, I will continue to support Jenise in the subsequent management and with ongoing continuity of care.        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  She has been doing well this past year although has some back issues which would limit her functional capacity.  Her back is better than it was a year ago but still bothers her when she walks any distance.  She denies chest discomfort, unusual shortness of breath with activity.  She denies orthopnea, paroxysmal nocturnal dyspnea, peripheral edema, syncope or near syncopal episodes.  She does have a history of 1 episode of atrial fibrillation and this last summer she had a brief episode of what she believes is atrial fibrillation at a [employer] game.  She admits to being  quite dehydrated that day and this has not recurred.  She wears a watch to monitor her rhythm.       Physical Examination Review of Systems   /62 (BP Location: Left arm, Patient Position: Sitting, Cuff Size: Adult Large)   Pulse 64   Resp 16   Wt 98.5 kg (217 lb 3.2 oz)   SpO2 96%   BMI 32.07 kg/m    Body mass index is 32.07 kg/m .  Wt Readings from Last 3 Encounters:   04/08/25 98.5 kg (217 lb 3.2 oz)   01/03/24 103.4 kg (228 lb)   11/30/23 103.1 kg (227 lb 3.2 oz)     General Appearance:   Alert, cooperative and in no acute distress.   ENT/Mouth: Pink/moist oral mucosa   EYES:  no scleral icterus, normal conjunctivae   Neck: JVP normal. No Hepatojugular reflux. Thyroid not visualized.   Chest/Lungs:   Lungs are clear to auscultation, equal chest wall expansion.   Cardiovascular:   S1, S2 with 2/6 systolic murmur , no clicks or rubs. Brachial, radial and posterior tibial pulses are intact and symetric. No carotid bruits noted   Abdomen:  Nontender.   Extremities: No cyanosis, clubbing or edema   Skin: no xanthelasma, warm.    Neurologic: normal arm movement bilateral, no tremors     Psychiatric: Appropriate affect.      Encounter Vitals  BP: 116/62  Pulse: 64  Resp: 16  SpO2: 96 %  Weight: 98.5 kg (217 lb 3.2 oz) (shoes on)                                           Medical History  Surgical History Family History Social History   No past medical history on file. No past surgical history on file. No family history on file. Social History     Socioeconomic History    Marital status: Single     Spouse name: Not on file    Number of children: Not on file    Years of education: Not on file    Highest education level: Not on file   Occupational History    Not on file   Tobacco Use    Smoking status: Never    Smokeless tobacco: Never   Vaping Use    Vaping status: Never Used   Substance and Sexual Activity    Alcohol use: Yes    Drug use: Never    Sexual activity: Not on file   Other Topics Concern    Not on  "file   Social History Narrative    Not on file     Social Drivers of Health     Financial Resource Strain: Not on file   Food Insecurity: Not on file   Transportation Needs: Not on file   Physical Activity: Not on file   Stress: Not on file   Social Connections: Not on file   Interpersonal Safety: Not on file   Housing Stability: Not on file          Medications  Allergies   Current Outpatient Medications   Medication Sig Dispense Refill    cyanocobalamin (CYANOCOBALAMIN) 1000 mcg/mL injection Inject 1,000 mcg into the muscle every 28 days.      esomeprazole (NEXIUM) 40 MG DR capsule Take 40 mg by mouth daily      hydrochlorothiazide (HYDRODIURIL) 25 MG tablet Take 25 mg by mouth      meloxicam (MOBIC) 15 MG tablet Take 15 mg by mouth daily      SUMAtriptan (IMITREX) 100 MG tablet Take 100 mg by mouth at onset of headache      traZODone (DESYREL) 100 MG tablet Take 100 mg by mouth at bedtime      ZEPBOUND 10 MG/0.5ML prefilled pen 10 mg every 7 days.      Allergies   Allergen Reactions    Nitrofurantoin Difficulty breathing and Other (See Comments)     \"Dantin\" group side effects  Pt cannot remember reaction      No Clinical Screening - See Comments Itching     Dust, pollen, cat dander,(lives with 3 cats), watermelon, make up- runny nose, sneezing, itchy eyes  Dust, pollen, cat dander,(lives with 3 cats), watermelon, make up- runny nose, sneezing, itchy eyes    Quinolones Other (See Comments)     Other reaction(s): Torn muscle  Torn muscle  Tendon inflammation      Zoster Vaccine Live Rash         Lab Results    Chemistry/lipid CBC Cardiac Enzymes/BNP/TSH/INR   Lab Results   Component Value Date    TRIG 103 08/10/2023    No results found for: \"WBC\", \"HGB\", \"HCT\", \"MCV\", \"PLT\" No results found for: \"CKTOTAL\", \"CKMB\", \"TROPONINI\", \"BNP\", \"TSH\", \"INR\"                                         "

## 2025-04-08 NOTE — LETTER
4/8/2025    Shai White MD  [facility] [address]  [facility] [address]    RE: Jenise Baker       Dear Colleague,     I had the pleasure of seeing Jenise H Olivia in the [facility].      [facility]  [phone]          Assessment/Recommendations   Patient with known bicuspid aortic valve, recent MRI showing minimal enlargement of the ascending aorta and sinus of Valsalva.  Mild aortic stenosis and mild aortic insufficiency.  Her functional capacity is unchanged although she does have some limitations with orthopedic issues.  No evidence of pulmonary vascular congestion.    She has a history of paroxysmal atrial fibrillation is a and has seen Dr. Limon in the past from electrophysiology.  She had a brief episode this past year and will keep us posted if she has more episodes as he is offered her other therapies.    Blood pressure is at goal, and I have asked her to maintain an active lifestyle with a minimum of 30 minutes of walking or some like exercise, and at least 5 times each week.    We will schedule a follow-up in 1 year at our [place] location and likely schedule imaging thereafter.    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 Due to the added complexity in care, I will continue to support Jenise in the subsequent management and with ongoing continuity of care.        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  She has been doing well this past year although has some back issues which would limit her functional capacity.  Her back is better than it was a year ago but still bothers her when she walks any distance.  She denies chest discomfort, unusual shortness of breath with activity.  She denies orthopnea, paroxysmal nocturnal dyspnea, peripheral edema, syncope  or near syncopal episodes.  She does have a history of 1 episode of atrial fibrillation and this last summer she had a brief episode of what she believes is atrial fibrillation at a [employer] game.  She admits to being quite dehydrated that day and this has not recurred.  She wears a watch to monitor her rhythm.       Physical Examination Review of Systems   /62 (BP Location: Left arm, Patient Position: Sitting, Cuff Size: Adult Large)   Pulse 64   Resp 16   Wt 98.5 kg (217 lb 3.2 oz)   SpO2 96%   BMI 32.07 kg/m    Body mass index is 32.07 kg/m .  Wt Readings from Last 3 Encounters:   04/08/25 98.5 kg (217 lb 3.2 oz)   01/03/24 103.4 kg (228 lb)   11/30/23 103.1 kg (227 lb 3.2 oz)     General Appearance:   Alert, cooperative and in no acute distress.   ENT/Mouth: Pink/moist oral mucosa   EYES:  no scleral icterus, normal conjunctivae   Neck: JVP normal. No Hepatojugular reflux. Thyroid not visualized.   Chest/Lungs:   Lungs are clear to auscultation, equal chest wall expansion.   Cardiovascular:   S1, S2 with 2/6 systolic murmur , no clicks or rubs. Brachial, radial and posterior tibial pulses are intact and symetric. No carotid bruits noted   Abdomen:  Nontender.   Extremities: No cyanosis, clubbing or edema   Skin: no xanthelasma, warm.    Neurologic: normal arm movement bilateral, no tremors     Psychiatric: Appropriate affect.      Encounter Vitals  BP: 116/62  Pulse: 64  Resp: 16  SpO2: 96 %  Weight: 98.5 kg (217 lb 3.2 oz) (shoes on)                                           Medical History  Surgical History Family History Social History   No past medical history on file. No past surgical history on file. No family history on file. Social History     Socioeconomic History     Marital status: Single     Spouse name: Not on file     Number of children: Not on file     Years of education: Not on file     Highest education level: Not on file   Occupational History     Not on file   Tobacco Use     Smoking  "status: Never     Smokeless tobacco: Never   Vaping Use     Vaping status: Never Used   Substance and Sexual Activity     Alcohol use: Yes     Drug use: Never     Sexual activity: Not on file   Other Topics Concern     Not on file   Social History Narrative     Not on file     Social Drivers of Health     Financial Resource Strain: Not on file   Food Insecurity: Not on file   Transportation Needs: Not on file   Physical Activity: Not on file   Stress: Not on file   Social Connections: Not on file   Interpersonal Safety: Not on file   Housing Stability: Not on file          Medications  Allergies   Current Outpatient Medications   Medication Sig Dispense Refill     cyanocobalamin (CYANOCOBALAMIN) 1000 mcg/mL injection Inject 1,000 mcg into the muscle every 28 days.       esomeprazole (NEXIUM) 40 MG DR capsule Take 40 mg by mouth daily       hydrochlorothiazide (HYDRODIURIL) 25 MG tablet Take 25 mg by mouth       meloxicam (MOBIC) 15 MG tablet Take 15 mg by mouth daily       SUMAtriptan (IMITREX) 100 MG tablet Take 100 mg by mouth at onset of headache       traZODone (DESYREL) 100 MG tablet Take 100 mg by mouth at bedtime       ZEPBOUND 10 MG/0.5ML prefilled pen 10 mg every 7 days.      Allergies   Allergen Reactions     Nitrofurantoin Difficulty breathing and Other (See Comments)     \"Dantin\" group side effects  Pt cannot remember reaction       No Clinical Screening - See Comments Itching     Dust, pollen, cat dander,(lives with 3 cats), watermelon, make up- runny nose, sneezing, itchy eyes  Dust, pollen, cat dander,(lives with 3 cats), watermelon, make up- runny nose, sneezing, itchy eyes     Quinolones Other (See Comments)     Other reaction(s): Torn muscle  Torn muscle  Tendon inflammation       Zoster Vaccine Live Rash         Lab Results    Chemistry/lipid CBC Cardiac Enzymes/BNP/TSH/INR   Lab Results   Component Value Date    TRIG 103 08/10/2023    No results found for: \"WBC\", \"HGB\", \"HCT\", \"MCV\", \"PLT\" No " "results found for: \"CKTOTAL\", \"CKMB\", \"TROPONINI\", \"BNP\", \"TSH\", \"INR\"                                             Thank you for allowing me to participate in the care of your patient.      Sincerely,     Sony Castle MD     [facility]  cc:   Shai White MD  [facility]  [address] [place] [address]      "

## 2025-04-09 PROBLEM — D12.6 COLON ADENOMAS: Status: ACTIVE | Noted: 2025-04-09

## 2025-06-10 ENCOUNTER — TELEPHONE (OUTPATIENT)
Dept: CARDIOLOGY | Facility: CLINIC | Age: 60
End: 2025-06-10
Payer: COMMERCIAL

## 2025-06-10 NOTE — TELEPHONE ENCOUNTER
[facility] Call Center    Phone Message    May a detailed message be left on voicemail: yes     Reason for Call: Other: Pt had an afib episode last night and they did an EKG and it came back with abnormal findings and told pt to be seen with her cardio team today to discuss test results and what to do moving forward, please reach out to pt to discuss asap      Action Taken: Other: Cardio    Travel Screening: Not Applicable     Date of Service:                                                                      Patient here today for bp ck.  Needs normal bp reading for surgical clearance.  Had preop with MOJGAN Jay on 7/22/19 and bp was elevated at that time.    bp today 126/84    Patient states is taking meds as prescribed, norvasc 5 mg daily, clonidine .1mg bid and propranolol 40 mg bid.    Patient instructed to continue same med regimine.  Will inform Dr Aguilar of normal bp reading for surgical clearance.

## 2025-06-10 NOTE — TELEPHONE ENCOUNTER
PC to patient to discuss her EKG. She states has captured afib X2 on her apple watch and was feeling palpitations until 0800 and she states went to urgent care for evaluation and was told to seek cardiology evaluation for abnormal EKG findings. She states has a copy of the EKG to share. Discussed RAC appt and patient agrees to plan. Per chart review patient has seen Dr Limon 1/3/24 for afib.   -sea

## 2025-06-18 ENCOUNTER — OFFICE VISIT (OUTPATIENT)
Dept: CARDIOLOGY | Facility: CLINIC | Age: 60
End: 2025-06-18
Payer: COMMERCIAL

## 2025-06-18 VITALS
WEIGHT: 214 LBS | HEIGHT: 70 IN | BODY MASS INDEX: 30.64 KG/M2 | HEART RATE: 70 BPM | RESPIRATION RATE: 12 BRPM | DIASTOLIC BLOOD PRESSURE: 68 MMHG | SYSTOLIC BLOOD PRESSURE: 120 MMHG

## 2025-06-18 DIAGNOSIS — R07.9 CHEST PAIN, UNSPECIFIED TYPE: ICD-10-CM

## 2025-06-18 DIAGNOSIS — I48.0 PAROXYSMAL ATRIAL FIBRILLATION (H): Primary | ICD-10-CM

## 2025-06-18 DIAGNOSIS — Q23.81 BICUSPID AORTIC VALVE: ICD-10-CM

## 2025-06-18 DIAGNOSIS — I77.89 ASCENDING AORTA ENLARGEMENT: ICD-10-CM

## 2025-06-18 RX ORDER — AMLODIPINE BESYLATE 2.5 MG/1
2.5 TABLET ORAL PRN
COMMUNITY
Start: 2025-04-17

## 2025-06-18 NOTE — PROGRESS NOTES
Assessment/Plan:   Paroxysmal atrial fibrillation: The patient was diagnosed with paroxysmal atrial fibrillation.  She has sleep apnea.  She is not on antiarrhythmic medications or AV maury blockade agents.  It is not surprised if she has episodes of atrial fibrillation on and off.  We discussed evaluation and management.  It seems the patient is reluctant to start antiarrhythmic medications.  After discussion, 2 weeks Zio patch monitor is requested for evaluation of atrial fibrillation burden.  If she has a more frequent episode, she is going to have ablation of atrial fibrillation with Dr. Limon who saw the patient in the past. Her [id number] score is 2 due to hypertension and female.  Discussed the risk of stroke of atrial fibrillation.  Depend on her Zio patch monitor findings, she may need anticoagulation if she has a frequent episode of atrial fibrillation.  She agreed with the plan.  Benign essential hypertension: The patient's blood pressure is controlled with hydrochlorothiazide.  Her recent potassium level was 3.7.  Bicuspid aortic valve with mild aortic valve stenosis and regurgitation, mild ascending aortic and aortic root dilation: Recent cardiac MRI report is discussed.  Continue to monitor.  Chest pain: She has occasional chest pain.  Her coronary calcium score in November 2022 was 0.  Cardiac MRI was reported normal LV size, wall motion and systolic function, no previous myocardial infarction.  Continue to monitor episode of chest pain.  Consider further evaluation if clinically indicated.  The patient agreed with the plan.    Total 44 minutes were spent in this visit for face to face visit, physical exam, review of current labs/imaging studies, plan for ongoing treatment.    Thank you for the opportunity to be involved in the care of Jenise Baker. If you have any questions, please feel free to contact me.  Patient is scheduled to follow-up with her cardiologist Dr. Castle in 1 to 2  months.    Much or all of the text in this note was generated through the use of Dragon Dictate voice-to-text software. Errors in spelling or words which seem out of context are unintentional. Sound alike errors, in particular, may have escaped editing.       History of Present Illness:   It is my pleasure to see Jenise Baker at the [facility] rapid access care clinic for the evaluation of abnormal ECG.  Jenise Baker is a 59 year old female with a medical history of paroxysmal atrial fibrillation, bicuspid aortic valve with mild stenosis and mild regurgitation, mild ascending aortic dilation, obstructive sleep apnea.    The patient is referred to rapid access to cardiology clinic for evaluation of abnormal ECG.  The patient states that she had at least 2 episodes of atrial fibrillation over the last 10 days, lasted for several hours.  She noticed that sometimes her heart rate was elevated, which might be episode of atrial fibrillation too.  She complains of palpitations when she had episode of atrial fibrillation, associated with mild lightheadedness.  She complained of 1 episode of chest pain yesterday, mild, sharp pain, lasted for several minutes, disappeared by itself, not exertional.  She had another episode 6 months ago.  She had no shortness of breath on exertion, orthopnea, PND or leg edema.  Her blood pressure and heart rate are controlled in normal range.    Past Medical History:     Patient Active Problem List   Diagnosis    Paroxysmal atrial fibrillation (H)    MARGAUX (obstructive sleep apnea)    Bicuspid aortic valve    Ascending aorta enlargement    Colon adenomas       Past Surgical History:   No past surgical history on file.    Family History:   No family history on file.     Social History:    reports that she has never smoked. She has never used smokeless tobacco. She reports current alcohol use. She reports that she does not use drugs.    Review of Systems:   12 systems are reviewed  "negative except for in HPI.    Meds:     Current Outpatient Medications:     amLODIPine (NORVASC) 2.5 MG tablet, Take 2.5 mg by mouth as needed., Disp: , Rfl:     cyanocobalamin (CYANOCOBALAMIN) 1000 mcg/mL injection, Inject 1,000 mcg into the muscle every 28 days., Disp: , Rfl:     esomeprazole (NEXIUM) 40 MG DR capsule, Take 40 mg by mouth daily, Disp: , Rfl:     hydrochlorothiazide (HYDRODIURIL) 25 MG tablet, Take 25 mg by mouth, Disp: , Rfl:     meloxicam (MOBIC) 15 MG tablet, Take 15 mg by mouth daily, Disp: , Rfl:     SUMAtriptan (IMITREX) 100 MG tablet, Take 100 mg by mouth at onset of headache, Disp: , Rfl:     traZODone (DESYREL) 100 MG tablet, Take 100 mg by mouth at bedtime, Disp: , Rfl:     ZEPBOUND 10 MG/0.5ML prefilled pen, 10 mg every 7 days., Disp: , Rfl:     Allergies:   Nitrofurantoin, No clinical screening - see comments, Quinolones, and Zoster vaccine live      Objective:      Physical Exam  97.1 kg (214 lb)  1.765 m (5' 9.5\")  Body mass index is 31.15 kg/m .  /68 (BP Location: Right arm, Patient Position: Sitting, Cuff Size: Adult Large)   Pulse 70   Resp 12   Ht 1.765 m (5' 9.5\")   Wt 97.1 kg (214 lb)   BMI 31.15 kg/m      General Appearance:   Awake, Alert, No acute distress.   HEENT:  Pupil equal and reactive to light. No scleral icterus; the mucous membranes were moist.   Neck: No cervical bruits. No JVD. No thyromegaly.     Chest: The spine was straight. The chest was symmetric.   Lungs:   Respirations unlabored; Lungs are clear to auscultation. No crackles. No wheezing.   Cardiovascular:   Regular rhythm and rate, normal first and second heart sounds with II/VI systolic murmurs at RUSB. No rubs or gallops.    Abdomen:  Soft. No tenderness. Non-distended. Bowels sounds are present   Extremities: Equal tibial pulses. No leg edema.   Skin: No rashes or ulcers. Warm, Dry.   Musculoskeletal: No tenderness. No deformity.   Neurologic: Mood and affect are appropriate. No focal " deficits.         EKG: Personally reviewed  Sinus rhythm  Left axis, poor R wave progression  Normal ECG    Cardiac Imaging Studies  Cardiac MRI on March 3, 2025:  1.  Left ventricular chamber size, wall thickness and systolic function are normal. The quantified left  ventricular ejection fraction is 61%.   2.  No evidence of prior myocardial infarction, focal nonvascular myocardial fibrosis or infiltrative  disease.  3.  Right ventricular chamber size and systolic function are normal.  The quantified right ventricular  ejection fraction is 50%.   4.  Calcified bicuspid aortic valve with fusion of the left and right coronary cusps (Yan type I).  There is mild aortic stenosis with peak forward velocity measuring 2.8 m/s, mean outflow gradient 14 mmHg  and aortic valve area by planimetry 1.64 cm^2. Mild aortic regurgitation is present.  5.  Non-aneurysmal enlargement of the mid ascending thoracic aorta with a maximum diameter of 4.0 cm.  6.  Radiology review for incidental non cardiac findings will be under a separate report by the  radiologist.    Lab Review   All her labs are reviewed

## 2025-06-18 NOTE — LETTER
6/18/2025    Shai White MD  [facility] [address]  [facility] [address]    RE: Jenise Baker       Dear Colleague,     I had the pleasure of seeing Jenise Baker in the [facility].            Assessment/Plan:   Paroxysmal atrial fibrillation: The patient was diagnosed with paroxysmal atrial fibrillation.  She has sleep apnea.  She is not on antiarrhythmic medications or AV maury blockade agents.  It is not surprised if she has episodes of atrial fibrillation on and off.  We discussed evaluation and management.  It seems the patient is reluctant to start antiarrhythmic medications.  After discussion, 2 weeks Zio patch monitor is requested for evaluation of atrial fibrillation burden.  If she has a more frequent episode, she is going to have ablation of atrial fibrillation with Dr. Limon who saw the patient in the past. Her [id number] score is 2 due to hypertension and female.  Discussed the risk of stroke of atrial fibrillation.  Depend on her Zio patch monitor findings, she may need anticoagulation if she has a frequent episode of atrial fibrillation.  She agreed with the plan.  Benign essential hypertension: The patient's blood pressure is controlled with hydrochlorothiazide.  Her recent potassium level was 3.7.  Bicuspid aortic valve with mild aortic valve stenosis and regurgitation, mild ascending aortic and aortic root dilation: Recent cardiac MRI report is discussed.  Continue to monitor.  Chest pain: She has occasional chest pain.  Her coronary calcium score in November 2022 was 0.  Cardiac MRI was reported normal LV size, wall motion and systolic function, no previous myocardial infarction.  Continue to monitor episode of chest pain.  Consider further evaluation if clinically indicated.  The patient agreed with the plan.    Total 44 minutes were spent in this visit for face to face visit, physical exam, review of current labs/imaging studies, plan for ongoing treatment.     Thank you for the opportunity to be involved in the care of Jenise Baker. If you have any questions, please feel free to contact me.  Patient is scheduled to follow-up with her cardiologist Dr. Castle in 1 to 2 months.    Much or all of the text in this note was generated through the use of Dragon Dictate voice-to-text software. Errors in spelling or words which seem out of context are unintentional. Sound alike errors, in particular, may have escaped editing.       History of Present Illness:   It is my pleasure to see Jenise Baker at the [facility] rapid access care clinic for the evaluation of abnormal ECG.  Jenise Baker is a 59 year old female with a medical history of paroxysmal atrial fibrillation, bicuspid aortic valve with mild stenosis and mild regurgitation, mild ascending aortic dilation, obstructive sleep apnea.    The patient is referred to rapid access to cardiology clinic for evaluation of abnormal ECG.  The patient states that she had at least 2 episodes of atrial fibrillation over the last 10 days, lasted for several hours.  She noticed that sometimes her heart rate was elevated, which might be episode of atrial fibrillation too.  She complains of palpitations when she had episode of atrial fibrillation, associated with mild lightheadedness.  She complained of 1 episode of chest pain yesterday, mild, sharp pain, lasted for several minutes, disappeared by itself, not exertional.  She had another episode 6 months ago.  She had no shortness of breath on exertion, orthopnea, PND or leg edema.  Her blood pressure and heart rate are controlled in normal range.    Past Medical History:     Patient Active Problem List   Diagnosis     Paroxysmal atrial fibrillation (H)     MARGAUX (obstructive sleep apnea)     Bicuspid aortic valve     Ascending aorta enlargement     Colon adenomas       Past Surgical History:   No past surgical history on file.    Family History:   No family history on  "file.     Social History:    reports that she has never smoked. She has never used smokeless tobacco. She reports current alcohol use. She reports that she does not use drugs.    Review of Systems:   12 systems are reviewed negative except for in HPI.    Meds:     Current Outpatient Medications:      amLODIPine (NORVASC) 2.5 MG tablet, Take 2.5 mg by mouth as needed., Disp: , Rfl:      cyanocobalamin (CYANOCOBALAMIN) 1000 mcg/mL injection, Inject 1,000 mcg into the muscle every 28 days., Disp: , Rfl:      esomeprazole (NEXIUM) 40 MG DR capsule, Take 40 mg by mouth daily, Disp: , Rfl:      hydrochlorothiazide (HYDRODIURIL) 25 MG tablet, Take 25 mg by mouth, Disp: , Rfl:      meloxicam (MOBIC) 15 MG tablet, Take 15 mg by mouth daily, Disp: , Rfl:      SUMAtriptan (IMITREX) 100 MG tablet, Take 100 mg by mouth at onset of headache, Disp: , Rfl:      traZODone (DESYREL) 100 MG tablet, Take 100 mg by mouth at bedtime, Disp: , Rfl:      ZEPBOUND 10 MG/0.5ML prefilled pen, 10 mg every 7 days., Disp: , Rfl:     Allergies:   Nitrofurantoin, No clinical screening - see comments, Quinolones, and Zoster vaccine live      Objective:      Physical Exam  97.1 kg (214 lb)  1.765 m (5' 9.5\")  Body mass index is 31.15 kg/m .  /68 (BP Location: Right arm, Patient Position: Sitting, Cuff Size: Adult Large)   Pulse 70   Resp 12   Ht 1.765 m (5' 9.5\")   Wt 97.1 kg (214 lb)   BMI 31.15 kg/m      General Appearance:   Awake, Alert, No acute distress.   HEENT:  Pupil equal and reactive to light. No scleral icterus; the mucous membranes were moist.   Neck: No cervical bruits. No JVD. No thyromegaly.     Chest: The spine was straight. The chest was symmetric.   Lungs:   Respirations unlabored; Lungs are clear to auscultation. No crackles. No wheezing.   Cardiovascular:   Regular rhythm and rate, normal first and second heart sounds with II/VI systolic murmurs at RUSB. No rubs or gallops.    Abdomen:  Soft. No tenderness. " Non-distended. Bowels sounds are present   Extremities: Equal tibial pulses. No leg edema.   Skin: No rashes or ulcers. Warm, Dry.   Musculoskeletal: No tenderness. No deformity.   Neurologic: Mood and affect are appropriate. No focal deficits.         EKG: Personally reviewed  Sinus rhythm  Left axis, poor R wave progression  Normal ECG    Cardiac Imaging Studies  Cardiac MRI on March 3, 2025:  1.  Left ventricular chamber size, wall thickness and systolic function are normal. The quantified left  ventricular ejection fraction is 61%.   2.  No evidence of prior myocardial infarction, focal nonvascular myocardial fibrosis or infiltrative  disease.  3.  Right ventricular chamber size and systolic function are normal.  The quantified right ventricular  ejection fraction is 50%.   4.  Calcified bicuspid aortic valve with fusion of the left and right coronary cusps (Yan type I).  There is mild aortic stenosis with peak forward velocity measuring 2.8 m/s, mean outflow gradient 14 mmHg  and aortic valve area by planimetry 1.64 cm^2. Mild aortic regurgitation is present.  5.  Non-aneurysmal enlargement of the mid ascending thoracic aorta with a maximum diameter of 4.0 cm.  6.  Radiology review for incidental non cardiac findings will be under a separate report by the  radiologist.    Lab Review   All her labs are reviewed            Thank you for allowing me to participate in the care of your patient.      Sincerely,     Sabine Rosado MD     [facility]  cc:   Shai White MD  [address] [address]

## 2025-06-18 NOTE — NURSING NOTE
Jenise Baker arrived here on 6/18/2025 2:20 PM for 8-14 Days  Zio monitor placement per ordering provider Dr. Rosado for the diagnosis Paroxysmal atrial fibrillation (H) [I48.0] .  Patient s skin was prepped per protocol. Dr. Rosado is the supervising MD.  Zio monitor was placed.  Instructions were reviewed with and given to the patient.  Patient verbalized understanding of wear, troubleshooting and monitor return instructions.

## 2025-07-15 ENCOUNTER — TELEPHONE (OUTPATIENT)
Dept: CARDIOLOGY | Facility: CLINIC | Age: 60
End: 2025-07-15
Payer: COMMERCIAL

## 2025-07-15 NOTE — TELEPHONE ENCOUNTER
[facility] Call Center    Phone Message    May a detailed message be left on voicemail: yes     Reason for Call: Other: PT rather upset that the phone number she was given to reach Faith Hoff does not work and she keeps getting transferred to locations that loop her back to scheduling.  Please call pt and provide a different number for her to reach Faith.  Thank You     Action Taken: Message routed to:  [facility] cardio    Travel Screening: Not Applicable    Thank you!  [facility]       Date of Service:

## 2025-08-25 DIAGNOSIS — I48.0 PAROXYSMAL ATRIAL FIBRILLATION (H): ICD-10-CM

## 2025-08-25 DIAGNOSIS — I47.10 SVT (SUPRAVENTRICULAR TACHYCARDIA): Primary | ICD-10-CM
